# Patient Record
Sex: MALE | URBAN - METROPOLITAN AREA
[De-identification: names, ages, dates, MRNs, and addresses within clinical notes are randomized per-mention and may not be internally consistent; named-entity substitution may affect disease eponyms.]

---

## 2022-06-04 ENCOUNTER — TELEPHONE ENCOUNTER (OUTPATIENT)
Dept: URBAN - METROPOLITAN AREA CLINIC 68 | Facility: CLINIC | Age: 82
End: 2022-06-04

## 2022-06-05 ENCOUNTER — TELEPHONE ENCOUNTER (OUTPATIENT)
Dept: URBAN - METROPOLITAN AREA CLINIC 68 | Facility: CLINIC | Age: 82
End: 2022-06-05

## 2022-06-05 RX ORDER — METHIMAZOLE 5 MG/1
TABLET ORAL AS DIRECTED
Status: ACTIVE | COMMUNITY
Start: 2010-03-26

## 2022-06-25 ENCOUNTER — TELEPHONE ENCOUNTER (OUTPATIENT)
Age: 82
End: 2022-06-25

## 2022-06-25 RX ORDER — POLYETHYLENE GLYCOL 3350, SODIUM SULFATE, SODIUM CHLORIDE, POTASSIUM CHLORIDE, ASCORBIC ACID, SODIUM ASCORBATE 7.5-2.691G
MOVIPREP(    AS DIRECTED ) INACTIVE -HX ENTRY KIT ORAL AS DIRECTED
OUTPATIENT
Start: 2010-01-13

## 2022-06-25 RX ORDER — HYDROCORTISONE ACETATE AND PRAMOXINE HYDROCHLORIDE 25; 10 MG/G; MG/G
ANALPRAM E 2.5% CREAM KIT(    AS DIRECTED ) INACTIVE -HX ENTRY CREAM TOPICAL AS DIRECTED
OUTPATIENT
Start: 2010-02-03

## 2022-06-25 RX ORDER — HYDROCORTISONE ACETATE 25 MG/1
ANUSOL HC SUPP.(    AS DIRECTED ) INACTIVE -HX ENTRY SUPPOSITORY RECTAL AS DIRECTED
OUTPATIENT
Start: 2010-02-15

## 2022-06-26 ENCOUNTER — TELEPHONE ENCOUNTER (OUTPATIENT)
Age: 82
End: 2022-06-26

## 2022-06-26 RX ORDER — HYDROCORTISONE ACETATE 25 MG/1
ANUSOL HC SUPP.(    AS DIRECTED ) ACTIVE -HX ENTRY SUPPOSITORY RECTAL AS DIRECTED
Status: ACTIVE | COMMUNITY
Start: 2010-10-19

## 2022-06-26 RX ORDER — METHIMAZOLE 5 MG/1
TABLET ORAL AS DIRECTED
Status: ACTIVE | COMMUNITY
Start: 2010-03-26

## 2022-06-26 RX ORDER — ASPIRIN 81 MG/1
TABLET, COATED ORAL
Status: ACTIVE | COMMUNITY
Start: 2010-01-13

## 2024-03-12 PROBLEM — Q21.12 PFO (PATENT FORAMEN OVALE) (HHS-HCC): Status: ACTIVE | Noted: 2024-03-12

## 2024-03-12 PROBLEM — R93.1 ELEVATED CORONARY ARTERY CALCIUM SCORE: Status: ACTIVE | Noted: 2024-03-12

## 2024-03-12 PROBLEM — E78.5 HYPERLIPIDEMIA: Status: ACTIVE | Noted: 2024-03-12

## 2024-03-12 RX ORDER — OMEPRAZOLE 20 MG/1
20 TABLET, DELAYED RELEASE ORAL
COMMUNITY

## 2024-03-12 RX ORDER — CLOPIDOGREL BISULFATE 75 MG/1
1 TABLET, FILM COATED ORAL DAILY
COMMUNITY

## 2024-03-12 RX ORDER — LORATADINE 10 MG/1
1 TABLET ORAL DAILY PRN
COMMUNITY

## 2024-03-12 RX ORDER — LATANOPROST 50 UG/ML
1 SOLUTION/ DROPS OPHTHALMIC DAILY
COMMUNITY
Start: 2022-06-24

## 2024-03-12 RX ORDER — PRAVASTATIN SODIUM 20 MG/1
20 TABLET ORAL DAILY
COMMUNITY

## 2024-05-28 ENCOUNTER — OFFICE VISIT (OUTPATIENT)
Dept: CARDIOLOGY | Facility: CLINIC | Age: 84
End: 2024-05-28
Payer: MEDICARE

## 2024-05-28 VITALS
DIASTOLIC BLOOD PRESSURE: 84 MMHG | HEIGHT: 71 IN | SYSTOLIC BLOOD PRESSURE: 102 MMHG | WEIGHT: 200 LBS | BODY MASS INDEX: 28 KG/M2 | HEART RATE: 72 BPM

## 2024-05-28 DIAGNOSIS — Q21.12 PFO (PATENT FORAMEN OVALE) (HHS-HCC): ICD-10-CM

## 2024-05-28 DIAGNOSIS — Z78.9 NEVER SMOKED CIGARETTES: ICD-10-CM

## 2024-05-28 DIAGNOSIS — E78.2 MIXED HYPERLIPIDEMIA: Primary | ICD-10-CM

## 2024-05-28 PROCEDURE — 1036F TOBACCO NON-USER: CPT | Performed by: INTERNAL MEDICINE

## 2024-05-28 PROCEDURE — 1159F MED LIST DOCD IN RCRD: CPT | Performed by: INTERNAL MEDICINE

## 2024-05-28 PROCEDURE — 99213 OFFICE O/P EST LOW 20 MIN: CPT | Performed by: INTERNAL MEDICINE

## 2024-05-28 RX ORDER — TESTOSTERONE CYPIONATE 200 MG/ML
200 INJECTION, SOLUTION INTRAMUSCULAR
COMMUNITY

## 2024-05-28 NOTE — PATIENT INSTRUCTIONS
Please bring all medicines, vitamins, and herbal supplements with you when you come to the office.    Prescriptions will not be filled unless you are compliant with your follow up appointments or have a follow up appointment scheduled as per instruction of your physician. Refills should be requested at the time of your visit.     BMI was above normal measurement. Current weight: 90.7 kg (200 lb)  Weight change since last visit (-) denotes wt loss 0 lbs   Weight loss needed to achieve BMI 25: 21.1 Lbs  Weight loss needed to achieve BMI 30: -14.6 Lbs  Provided instructions on dietary changes  Provided instructions on exercise.

## 2024-05-28 NOTE — PROGRESS NOTES
"Subjective   Daron Joshua is a 84 y.o. male       Chief Complaint    Follow-up          HPI     Patient returns in follow-up of problems as noted.  He is done well.  He was concerned about abdominal aortic aneurysm and we referred him to the [facility]+ [facility] at the [facility] for an abdominal ultrasound.  His father had an aneurysm but he was a smoker and the patient himself is not a smoker    In regards to his PFO he is doing well Plavix therapy has had no neurologic symptoms.  Lipids are also adequately treated and because of all the above we suggest no changes.  Increased BMI was noted and the merits of a modest diet were advocated      Vitals:    05/28/24 0927   BP: 102/84   BP Location: Right arm   Patient Position: Sitting   Pulse: 72   Weight: 90.7 kg (200 lb)   Height: 1.803 m (5' 11\")        Objective   Physical Exam  Constitutional:       Appearance: Normal appearance.   HENT:      Nose: Nose normal.   Neck:      Vascular: No carotid bruit.   Cardiovascular:      Rate and Rhythm: Normal rate.      Pulses: Normal pulses.      Heart sounds: Normal heart sounds.   Pulmonary:      Effort: Pulmonary effort is normal.   Abdominal:      General: Bowel sounds are normal.      Palpations: Abdomen is soft.   Musculoskeletal:         General: Normal range of motion.      Cervical back: Normal range of motion.      Right lower leg: No edema.      Left lower leg: No edema.   Skin:     General: Skin is warm and dry.   Neurological:      General: No focal deficit present.      Mental Status: He is alert.   Psychiatric:         Mood and Affect: Mood normal.         Behavior: Behavior normal.         Thought Content: Thought content normal.         Judgment: Judgment normal.         Allergies  Patient has no known allergies.     Current Medications    Current Outpatient Medications:     latanoprost (Xalatan) 0.005 % ophthalmic solution, Administer 1 drop into both eyes once daily., Disp: , Rfl:     loratadine (Claritin) 10 mg " tablet, Take 1 tablet (10 mg) by mouth once daily as needed for allergies., Disp: , Rfl:     omeprazole OTC (PriLOSEC OTC) 20 mg EC tablet, Take 1 tablet (20 mg) by mouth once daily in the morning. Take before meals., Disp: , Rfl:     Plavix 75 mg tablet, Take 1 tablet (75 mg) by mouth once daily., Disp: , Rfl:     pravastatin (Pravachol) 20 mg tablet, Take 1 tablet (20 mg) by mouth once daily., Disp: , Rfl:     testosterone cypionate (Depo-Testosterone) 200 mg/mL injection, Inject 1 mL (200 mg) into the muscle every 28 (twenty-eight) days., Disp: , Rfl:                      Assessment/Plan   1. Mixed hyperlipidemia  Adequately treated with current strategy    2. PFO (patent foramen ovale) ([facility])  Plavix well-tolerated.  No neurologic symptoms    3. Never smoked cigarettes  Noted           Scribe Attestation  By signing my name below, Genevieve CLINE LPN, Scribe   attest that this documentation has been prepared under the direction and in the presence of Ty Art MD.     Provider Attestation - Scribe documentation    All medical record entries made by the Scribe were at my direction and personally dictated by me. I have reviewed the chart and agree that the record accurately reflects my personal performance of the history, physical exam, discussion and plan.

## 2024-09-13 DIAGNOSIS — E78.5 HYPERLIPIDEMIA, UNSPECIFIED: ICD-10-CM

## 2024-09-13 RX ORDER — PRAVASTATIN SODIUM 20 MG/1
10 TABLET ORAL DAILY
Qty: 45 TABLET | Refills: 3 | Status: SHIPPED | OUTPATIENT
Start: 2024-09-13

## 2025-06-06 ENCOUNTER — APPOINTMENT (OUTPATIENT)
Dept: CARDIOLOGY | Facility: CLINIC | Age: 85
End: 2025-06-06
Payer: COMMERCIAL

## 2025-07-18 ENCOUNTER — TELEPHONE (OUTPATIENT)
Dept: CARDIOLOGY | Facility: CLINIC | Age: 85
End: 2025-07-18

## 2025-07-18 ENCOUNTER — APPOINTMENT (OUTPATIENT)
Dept: CARDIOLOGY | Facility: CLINIC | Age: 85
End: 2025-07-18
Payer: COMMERCIAL

## 2025-07-18 NOTE — TELEPHONE ENCOUNTER
Patient called stating he had switching his appointment with his spouse for today so she could be seen as she wasn't feeling well. This was not updated in the system. However patient and spouse will now both be canceled as the spouse is not feeling well. Both will call back to reschedule at this point. Spouse Jewell Joshua.

## 2025-08-04 ENCOUNTER — APPOINTMENT (OUTPATIENT)
Dept: CARDIOLOGY | Facility: CLINIC | Age: 85
End: 2025-08-04
Payer: MEDICARE

## 2025-08-04 VITALS
HEART RATE: 69 BPM | DIASTOLIC BLOOD PRESSURE: 70 MMHG | BODY MASS INDEX: 26.96 KG/M2 | HEIGHT: 71 IN | SYSTOLIC BLOOD PRESSURE: 128 MMHG | WEIGHT: 192.6 LBS

## 2025-08-04 DIAGNOSIS — G45.4 TRANSIENT GLOBAL AMNESIA: ICD-10-CM

## 2025-08-04 DIAGNOSIS — Q21.12 PFO (PATENT FORAMEN OVALE) (HHS-HCC): Primary | ICD-10-CM

## 2025-08-04 DIAGNOSIS — R93.1 ELEVATED CORONARY ARTERY CALCIUM SCORE: ICD-10-CM

## 2025-08-04 DIAGNOSIS — E78.2 MIXED HYPERLIPIDEMIA: ICD-10-CM

## 2025-08-04 DIAGNOSIS — Z78.9 NEVER SMOKED CIGARETTES: ICD-10-CM

## 2025-08-04 PROCEDURE — 1159F MED LIST DOCD IN RCRD: CPT | Performed by: INTERNAL MEDICINE

## 2025-08-04 PROCEDURE — 1036F TOBACCO NON-USER: CPT | Performed by: INTERNAL MEDICINE

## 2025-08-04 PROCEDURE — 1160F RVW MEDS BY RX/DR IN RCRD: CPT | Performed by: INTERNAL MEDICINE

## 2025-08-04 PROCEDURE — 93000 ELECTROCARDIOGRAM COMPLETE: CPT | Performed by: INTERNAL MEDICINE

## 2025-08-04 PROCEDURE — 99213 OFFICE O/P EST LOW 20 MIN: CPT | Performed by: INTERNAL MEDICINE

## 2025-08-04 RX ORDER — DORZOLAMIDE HYDROCHLORIDE AND TIMOLOL MALEATE 20; 5 MG/ML; MG/ML
1 SOLUTION/ DROPS OPHTHALMIC 2 TIMES DAILY
COMMUNITY
Start: 2024-10-02

## 2025-08-04 RX ORDER — BRIMONIDINE TARTRATE 2 MG/ML
1 SOLUTION/ DROPS OPHTHALMIC 2 TIMES DAILY
COMMUNITY

## 2025-08-04 NOTE — PROGRESS NOTES
"Chief Complaint   Patient presents with    Annual Exam     1 year, hyperlipidemia       Subjective   Daron Joshua is a 85 y.o. male     HPI   Patient is here for follow-up and management for previous history of PFO.  He is a former patient of Dr. Art.  Patient report about 6 years ago while he was in [place] back in 2019 he had a global amnestic reaction lasted few hours.  He underwent extensive evaluation with finding of patent foramen ovale membrane.  Patient also noted to have high calcium scoring about 300.  Patient remains active.  He is 85.  He goes to the gym regularly.  He denies complaint of chest pain, palpitation, lightheadedness, dizziness or syncope.  His recent lab noted and demonstrated LDL around 80.  Patient has been stable without any cardiac complaint    1.  Patient presumed to have a PFO based on workup 6 years ago in [place] even though the echo did not seem to see explicitly indicated that  2.  Mildly elevated calcium scoring of 300  3.  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  BMI of 26    Plan    1.  I suggest the patient to repeat his echocardiogram with the use of agitated saline to confirm the diagnosis  2.  I agree with continued treatment with Plavix and pravastatin  3.  I reviewed his recent lipid profile  4.  We discussed risk factor modification  5.  I advised the patient to notify me of any change in cardiac status or symptoms  6.  I will see him back in 1 year    Plan  Review of Systems   All other systems reviewed and are negative.           Vitals:    08/04/25 0915   BP: 128/70   BP Location: Left arm   Patient Position: Sitting   Pulse: 69   Weight: 87.4 kg (192 lb 9.6 oz)   Height: 1.803 m (5' 11\")      EKG done in office today     Objective   Physical Exam  Constitutional:       Appearance: Normal appearance.   HENT:      Nose: Nose normal.   Neck:      Vascular: No carotid bruit.     Cardiovascular:      Rate and Rhythm: Normal rate. "      Pulses: Normal pulses.      Heart sounds: Normal heart sounds.   Pulmonary:      Effort: Pulmonary effort is normal.   Abdominal:      General: Bowel sounds are normal.      Palpations: Abdomen is soft.     Musculoskeletal:         General: Normal range of motion.      Cervical back: Normal range of motion.      Right lower leg: No edema.      Left lower leg: No edema.     Skin:     General: Skin is warm and dry.     Neurological:      General: No focal deficit present.      Mental Status: He is alert.     Psychiatric:         Mood and Affect: Mood normal.         Behavior: Behavior normal.         Thought Content: Thought content normal.         Judgment: Judgment normal.         Allergies  Patient has no known allergies.     Current Medications  Current Outpatient Medications   Medication Instructions    brimonidine (AlphaGAN) 0.2 % ophthalmic solution 1 drop, 2 times daily    dorzolamide-timoloL (Cosopt) 22.3-6.8 mg/mL ophthalmic solution 1 drop, 2 times daily    latanoprost (Xalatan) 0.005 % ophthalmic solution 1 drop, Daily    loratadine (Claritin) 10 mg tablet 1 tablet, Daily PRN    omeprazole OTC (PRILOSEC OTC) 20 mg, Daily before breakfast    Plavix 75 mg tablet 1 tablet, Daily    pravastatin (PRAVACHOL) 10 mg, oral, Daily    testosterone cypionate (DEPO-TESTOSTERONE) 200 mg, Every 28 days                        Assessment/Plan   1. PFO (patent foramen ovale) ([facility])  Follow Up In Cardiology    ECG 12 Lead    Transthoracic Echo Complete      2. Mixed hyperlipidemia  Follow Up In Cardiology    Transthoracic Echo Complete      3. Elevated coronary artery calcium score  Transthoracic Echo Complete      4. Never smoked cigarettes        5. BMI 26.0-26.9,adult        6. Transient global amnesia                 Scribe Attestation  By signing my name below, MARLYN, Thu HARRIS LPN  , Scribe   attest that this documentation has been prepared under the direction and in the presence of Nicole Hollingsworth MD.      Provider Attestation - Scribe documentation    All medical record entries made by the Scribe were at my direction and personally dictated by me. I have reviewed the chart and agree that the record accurately reflects my personal performance of the history, physical exam, discussion and plan.

## 2025-08-04 NOTE — PATIENT INSTRUCTIONS
Please bring all medicines, vitamins, and herbal supplements with you when you come to the office.    Prescriptions will not be filled unless you are compliant with your follow up appointments or have a follow up appointment scheduled as per instruction of your physician. Refills should be requested at the time of your visit.     BMI was above normal measurement. Current weight: 87.4 kg (192 lb 9.6 oz)  Weight change since last visit (-) denotes wt loss -7.4 lbs   Weight loss needed to achieve BMI 25: 13.7 Lbs  Weight loss needed to achieve BMI 30: -22 Lbs  Provided instructions on dietary changes  Provided instructions on exercise.

## 2025-08-04 NOTE — LETTER
August 4, 2025     Yolande Sharma DO  [address]  [place] [address]    Patient: Daron Joshua   YOB: 1940   Date of Visit: 8/4/2025       Dear Dr. Yolande Sharma DO:    Thank you for referring Daron Joshua to me for evaluation. Below are my notes for this consultation.  If you have questions, please do not hesitate to call me. I look forward to following your patient along with you.       Sincerely,     Nicole Hollingsworth MD      CC: No Recipients  ______________________________________________________________________________________    Chief Complaint   Patient presents with   • Annual Exam     1 year, hyperlipidemia       Subjective   Daron Joshua is a 85 y.o. male     HPI   Patient is here for follow-up and management for previous history of PFO.  He is a former patient of Dr. Art.  Patient report about 6 years ago while he was in [place] back in 2019 he had a global amnestic reaction lasted few hours.  He underwent extensive evaluation with finding of patent foramen ovale membrane.  Patient also noted to have high calcium scoring about 300.  Patient remains active.  He is 85.  He goes to the gym regularly.  He denies complaint of chest pain, palpitation, lightheadedness, dizziness or syncope.  His recent lab noted and demonstrated LDL around 80.  Patient has been stable without any cardiac complaint    1.  Patient presumed to have a PFO based on workup 6 years ago in [place] even though the echo did not seem to see explicitly indicated that  2.  Mildly elevated calcium scoring of 300  3.  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  BMI of 26    Plan    1.  I suggest the patient to repeat his echocardiogram with the use of agitated saline to confirm the diagnosis  2.  I agree with continued treatment with Plavix and pravastatin  3.  I reviewed his recent lipid profile  4.  We discussed risk factor modification  5.  I  "advised the patient to notify me of any change in cardiac status or symptoms  6.  I will see him back in 1 year    Plan  Review of Systems   All other systems reviewed and are negative.           Vitals:    08/04/25 0915   BP: 128/70   BP Location: Left arm   Patient Position: Sitting   Pulse: 69   Weight: 87.4 kg (192 lb 9.6 oz)   Height: 1.803 m (5' 11\")      EKG done in office today     Objective   Physical Exam  Constitutional:       Appearance: Normal appearance.   HENT:      Nose: Nose normal.   Neck:      Vascular: No carotid bruit.     Cardiovascular:      Rate and Rhythm: Normal rate.      Pulses: Normal pulses.      Heart sounds: Normal heart sounds.   Pulmonary:      Effort: Pulmonary effort is normal.   Abdominal:      General: Bowel sounds are normal.      Palpations: Abdomen is soft.     Musculoskeletal:         General: Normal range of motion.      Cervical back: Normal range of motion.      Right lower leg: No edema.      Left lower leg: No edema.     Skin:     General: Skin is warm and dry.     Neurological:      General: No focal deficit present.      Mental Status: He is alert.     Psychiatric:         Mood and Affect: Mood normal.         Behavior: Behavior normal.         Thought Content: Thought content normal.         Judgment: Judgment normal.         Allergies  Patient has no known allergies.     Current Medications  Current Outpatient Medications   Medication Instructions   • brimonidine (AlphaGAN) 0.2 % ophthalmic solution 1 drop, 2 times daily   • dorzolamide-timoloL (Cosopt) 22.3-6.8 mg/mL ophthalmic solution 1 drop, 2 times daily   • latanoprost (Xalatan) 0.005 % ophthalmic solution 1 drop, Daily   • loratadine (Claritin) 10 mg tablet 1 tablet, Daily PRN   • omeprazole OTC (PRILOSEC OTC) 20 mg, Daily before breakfast   • Plavix 75 mg tablet 1 tablet, Daily   • pravastatin (PRAVACHOL) 10 mg, oral, Daily   • testosterone cypionate (DEPO-TESTOSTERONE) 200 mg, Every 28 days            "             Assessment/Plan   1. PFO (patent foramen ovale) ([facility])  Follow Up In Cardiology    ECG 12 Lead    Transthoracic Echo Complete      2. Mixed hyperlipidemia  Follow Up In Cardiology    Transthoracic Echo Complete      3. Elevated coronary artery calcium score  Transthoracic Echo Complete      4. Never smoked cigarettes        5. BMI 26.0-26.9,adult        6. Transient global amnesia                 Scribe Attestation  By signing my name below, I, Thu HARRIS LPN  , Scribe   attest that this documentation has been prepared under the direction and in the presence of Nicole Hollingsworth MD.     Provider Attestation - Scribe documentation    All medical record entries made by the Scribe were at my direction and personally dictated by me. I have reviewed the chart and agree that the record accurately reflects my personal performance of the history, physical exam, discussion and plan.

## 2025-09-03 DIAGNOSIS — E78.5 HYPERLIPIDEMIA, UNSPECIFIED: ICD-10-CM

## 2025-09-03 RX ORDER — PRAVASTATIN SODIUM 20 MG/1
10 TABLET ORAL DAILY
Qty: 45 TABLET | Refills: 3 | Status: SHIPPED | OUTPATIENT
Start: 2025-09-03 | End: 2026-09-03

## 2025-09-04 ENCOUNTER — HOSPITAL ENCOUNTER (OUTPATIENT)
Dept: CARDIOLOGY | Facility: CLINIC | Age: 85
Discharge: HOME | End: 2025-09-04
Payer: COMMERCIAL

## 2025-09-04 VITALS
WEIGHT: 192 LBS | DIASTOLIC BLOOD PRESSURE: 70 MMHG | BODY MASS INDEX: 26.88 KG/M2 | SYSTOLIC BLOOD PRESSURE: 105 MMHG | HEIGHT: 71 IN

## 2025-09-04 DIAGNOSIS — R93.1 ELEVATED CORONARY ARTERY CALCIUM SCORE: ICD-10-CM

## 2025-09-04 DIAGNOSIS — E78.2 MIXED HYPERLIPIDEMIA: ICD-10-CM

## 2025-09-04 DIAGNOSIS — Q21.12 PFO (PATENT FORAMEN OVALE) (HHS-HCC): ICD-10-CM

## 2025-09-04 LAB
AORTIC VALVE MEAN GRADIENT: 2 MMHG
AORTIC VALVE PEAK VELOCITY: 0.89 M/S
AV PEAK GRADIENT: 3 MMHG
AVA (PEAK VEL): 2.57 CM2
AVA (VTI): 2.29 CM2
EJECTION FRACTION APICAL 4 CHAMBER: 58.4
EJECTION FRACTION: 48 %
LEFT VENTRICLE INTERNAL DIMENSION DIASTOLE: 4.49 CM (ref 3.5–6)
LEFT VENTRICULAR OUTFLOW TRACT DIAMETER: 2 CM
LV EJECTION FRACTION BIPLANE: 57 %
MITRAL VALVE E/A RATIO: 0.62
MITRAL VALVE E/E' RATIO: 6.3
RIGHT VENTRICLE PEAK SYSTOLIC PRESSURE: 33 MMHG

## 2025-09-04 PROCEDURE — 93306 TTE W/DOPPLER COMPLETE: CPT | Performed by: INTERNAL MEDICINE

## 2025-09-04 PROCEDURE — 93306 TTE W/DOPPLER COMPLETE: CPT

## 2026-08-07 ENCOUNTER — APPOINTMENT (OUTPATIENT)
Dept: CARDIOLOGY | Facility: CLINIC | Age: 86
End: 2026-08-07
Payer: COMMERCIAL